# Patient Record
Sex: FEMALE | Race: WHITE
[De-identification: names, ages, dates, MRNs, and addresses within clinical notes are randomized per-mention and may not be internally consistent; named-entity substitution may affect disease eponyms.]

---

## 2019-03-19 ENCOUNTER — HOSPITAL ENCOUNTER (EMERGENCY)
Dept: HOSPITAL 91 - FTE | Age: 24
LOS: 1 days | Discharge: HOME | End: 2019-03-20
Payer: SELF-PAY

## 2019-03-19 ENCOUNTER — HOSPITAL ENCOUNTER (EMERGENCY)
Dept: HOSPITAL 10 - FTE | Age: 24
LOS: 1 days | Discharge: HOME | End: 2019-03-20
Payer: SELF-PAY

## 2019-03-19 VITALS
HEIGHT: 63 IN | WEIGHT: 167.33 LBS | BODY MASS INDEX: 29.65 KG/M2 | WEIGHT: 167.33 LBS | BODY MASS INDEX: 29.65 KG/M2 | HEIGHT: 63 IN

## 2019-03-19 DIAGNOSIS — L03.115: Primary | ICD-10-CM

## 2019-03-19 PROCEDURE — 99283 EMERGENCY DEPT VISIT LOW MDM: CPT

## 2019-03-20 VITALS — HEART RATE: 75 BPM | RESPIRATION RATE: 18 BRPM | SYSTOLIC BLOOD PRESSURE: 141 MMHG | DIASTOLIC BLOOD PRESSURE: 86 MMHG

## 2019-03-20 NOTE — ERD
ER Documentation


Chief Complaint


Chief Complaint





Redness, pain, itching after hiking bilaterally





HPI


23-year-old female states that last week she went hiking and she scratched her 


right lower extremity and over the course of the week she is been developing 


more redness and swelling at the site as well as similar lesion on the left 


lower extremity.  She noticed some purulent drainage from it.  No fever.  The 


area is itchy.





ROS


All systems reviewed and are negative except as per history of present illness.





Medications


Home Meds


Active Scripts


Cephalexin* (Keflex*) 500 Mg Capsule, 500 MG PO QID for 7 Days, CAP


   Prov:ELLEN BERNAL PA-C         3/20/19


Mupirocin* (Bactroban*) 2% -22 Gram Oint...g., 1 APPLIC TOP BID for 7 Days, EA


   Prov:ELLEN BERNAL PA-C         3/20/19





Allergies


Allergies:  


Coded Allergies:  


     No Known Allergy (Unverified , 3/19/19)





PMhx/Soc


Medical and Surgical Hx:  pt denies Medical Hx, pt denies Surgical Hx


Hx Alcohol Use:  No


Hx Substance Use:  No


Hx Tobacco Use:  No


Smoking Status:  Never smoker





FmHx


Family History:  No diabetes





Physical Exam


Vitals





Vital Signs


  Date      Temp  Pulse  Resp  B/P (MAP)   Pulse Ox  O2          O2 Flow    FiO2


Time                                                 Delivery    Rate


   3/19/19  99.2     72    16      176/86        96


     23:15                          (116)





Physical Exam


Const:   No acute distress


Head:   Atraumatic 


Eyes:    Normal Conjunctiva


ENT:    Normal External Ears, Nose and Mouth.


Neck:               Full range of motion. No meningismus.


Resp:   Clear to auscultation bilaterally


Cardio:   Regular rate and rhythm, no murmurs


Right lower extremity: Has a well-defined flat erythematous area irregularly 


shaped approximately 5-7 cm in diameter, scant purulent drainage, mild warmth, 


similar smaller lesions on left lower extremity





Procedures/MDM


Patient has likely cellulitis on the right lower extremity after she got scratch


with a tree branch while hiking about a week ago.  There is no abscess formation


or indication for incision and drainage at this time.  She was given 


prescription for Keflex as well as Bactroban cream.  Patient counseled regarding


my diagnostic impression and care plan. Prior to discharge all questions 


answered. Pt agrees with treatment plan and understands strict return 


precautions. Pt is instructed to follow up with primary care provider within 24-


48 hours. Precautionary instructions provided including instructions to return 


to the ER if not improving or for any worsening or changing symptoms or 


concerns.





Departure


Diagnosis:  


   Primary Impression:  


   Cellulitis


Condition:  Stable


Patient Instructions:  Cellulitis





Additional Instructions:  


Call your primary care doctor TOMORROW for an appointment during the next 1-2 


days.See the doctor sooner or return here if your condition worsens before your 


appointment time.











ELLEN BERNAL PA-C            Mar 20, 2019 01:15